# Patient Record
(demographics unavailable — no encounter records)

---

## 2018-05-11 NOTE — DIAGNOSTIC IMAGING REPORT
PROCEDURE:EXTREMITY ULTRASOUND

 

COMPARISON:None.

 

INDICATIONS:B Cell Chronic Lymphocytic Leukemia

 

TECHNIQUE:Gray scale, color Doppler sonographic images of bilateral 

axillary regions were performed.

 

FINDINGS:

Several lymph nodes are noted in the right axillary region, with the 

largest measuring 2.3 x 0.8 x 1.5 cm and showing normal fatty hilum.

 

Several lymph nodes are noted in the left axillary region, with the 2 

largest as follows:

1.9 x 1.1 x 1.9 cm hypoechoic mildly enlarged lymph node with loss of 

normal fatty hilum.

1.2 x 0.8 x 1.1 cm hypoechoic lymph node with loss of normal fatty 

hilum. 

 

CONCLUSION:

1. Mildly enlarged left axillary lymph node with loss of normal fatty 

hilum, likely reflecting known CLL. A second lymph node in the left 

axilla also shows loss of normal fatty hilum.

2. Normal sized, normal appearing lymph node in the right axillary 

region.

 

 

 

 

 

Jesus Regalado M.D.  

Dictated by:  Jesus Regalado M.D. on 5/11/2018 at 18:07     

Electronically approved by:  Jesus Regalado M.D. on 5/11/2018 at 

18:07